# Patient Record
Sex: FEMALE | Race: WHITE | NOT HISPANIC OR LATINO | Employment: OTHER | URBAN - METROPOLITAN AREA
[De-identification: names, ages, dates, MRNs, and addresses within clinical notes are randomized per-mention and may not be internally consistent; named-entity substitution may affect disease eponyms.]

---

## 2024-06-16 ENCOUNTER — HOSPITAL ENCOUNTER (EMERGENCY)
Facility: HOSPITAL | Age: 84
Discharge: HOME | End: 2024-06-16
Attending: EMERGENCY MEDICINE | Admitting: EMERGENCY MEDICINE
Payer: MEDICARE

## 2024-06-16 ENCOUNTER — APPOINTMENT (OUTPATIENT)
Facility: HOSPITAL | Age: 84
End: 2024-06-16
Payer: MEDICARE

## 2024-06-16 VITALS
RESPIRATION RATE: 22 BRPM | OXYGEN SATURATION: 100 % | HEIGHT: 63 IN | DIASTOLIC BLOOD PRESSURE: 88 MMHG | HEART RATE: 73 BPM | WEIGHT: 170 LBS | SYSTOLIC BLOOD PRESSURE: 186 MMHG | BODY MASS INDEX: 30.12 KG/M2 | TEMPERATURE: 98.2 F

## 2024-06-16 DIAGNOSIS — R51.9 ACUTE NONINTRACTABLE HEADACHE, UNSPECIFIED HEADACHE TYPE: Primary | ICD-10-CM

## 2024-06-16 PROCEDURE — 99284 EMERGENCY DEPT VISIT MOD MDM: CPT

## 2024-06-16 PROCEDURE — 70450 CT HEAD/BRAIN W/O DYE: CPT

## 2024-06-16 PROCEDURE — 96374 THER/PROPH/DIAG INJ IV PUSH: CPT

## 2024-06-16 PROCEDURE — 25010000002 KETOROLAC TROMETHAMINE PER 15 MG: Performed by: EMERGENCY MEDICINE

## 2024-06-16 RX ORDER — KETOROLAC TROMETHAMINE 15 MG/ML
10 INJECTION, SOLUTION INTRAMUSCULAR; INTRAVENOUS ONCE
Status: COMPLETED | OUTPATIENT
Start: 2024-06-16 | End: 2024-06-16

## 2024-06-16 RX ORDER — SODIUM CHLORIDE 0.9 % (FLUSH) 0.9 %
10 SYRINGE (ML) INJECTION AS NEEDED
Status: DISCONTINUED | OUTPATIENT
Start: 2024-06-16 | End: 2024-06-16 | Stop reason: HOSPADM

## 2024-06-16 RX ADMIN — KETOROLAC TROMETHAMINE 10 MG: 15 INJECTION, SOLUTION INTRAMUSCULAR; INTRAVENOUS at 10:08

## 2024-06-16 NOTE — FSED PROVIDER NOTE
"Subjective  History of Present Illness:    The patient presents the emergency department with headache.  The patient reports a right frontal and occipital headache.  Patient presents to the emergency department with her sister and niece.  The patient's sister advises that the patient has a history of bipolar disorder and that the patient has been manic.  The patient has not been sleeping.  The patient's sister also advises that the patient has been arguing with her eldest daughter.  She believes that this stressor is contributing to the patient's headache.  Patient denies aggravating factors but reports that Tylenol creates partial alleviation.      Nurses Notes reviewed and agree, including vitals, allergies, social history and prior medical history.     REVIEW OF SYSTEMS: All systems reviewed and not pertinent unless noted.  Review of Systems   Neurological:  Positive for headaches.       Past Medical History:   Diagnosis Date    Fibrocystic breast     Menopausal symptoms        Allergies:    Erythromycin      History reviewed. No pertinent surgical history.      Social History     Socioeconomic History    Marital status:    Tobacco Use    Smoking status: Never   Substance and Sexual Activity    Alcohol use: No    Sexual activity: Yes     Partners: Male         Family History   Problem Relation Age of Onset    Breast cancer Mother     Colon cancer Mother     Colon cancer Sister        Objective  Physical Exam:  BP (!) 186/88   Pulse 73   Temp 98.2 °F (36.8 °C) (Oral)   Resp 22   Ht 160 cm (63\")   Wt 77.1 kg (170 lb)   SpO2 100%   BMI 30.11 kg/m²      Physical Exam  Vitals and nursing note reviewed.   Constitutional:       Appearance: Normal appearance.   HENT:      Right Ear: External ear normal.      Left Ear: External ear normal.      Nose: Nose normal.      Mouth/Throat:      Mouth: Mucous membranes are moist.   Eyes:      Extraocular Movements: Extraocular movements intact.   Cardiovascular:      " Rate and Rhythm: Normal rate and regular rhythm.   Pulmonary:      Effort: Pulmonary effort is normal.      Breath sounds: Normal breath sounds.   Musculoskeletal:         General: Normal range of motion.   Skin:     General: Skin is warm and dry.   Neurological:      General: No focal deficit present.      Mental Status: She is alert.      Sensory: No sensory deficit.      Motor: No weakness.   Psychiatric:         Mood and Affect: Mood normal.         Behavior: Behavior normal.         Thought Content: Thought content normal.         Procedures    ED Course:         Lab Results (last 24 hours)       ** No results found for the last 24 hours. **             CT Head Without Contrast    Result Date: 6/16/2024  CT HEAD WO CONTRAST Date of Exam: 6/16/2024 9:50 AM EDT Indication: headache. Comparison: None available. Technique: Axial CT images were obtained of the head without contrast administration.  Automated exposure control and iterative construction methods were used. FINDINGS: Gray-white differentiation is maintained and there is no evidence of intracranial hemorrhage, mass or mass effect. Age-related changes of the brain are present including volume loss and typical periventricular sequela of chronic small vessel ischemia. Subarachnoid spaces overlying both frontal and parietal lobes are prominent, either chronic hygroma or ex vacuo prominence due to adjacent volume loss. There is otherwise no evidence of intracranial hemorrhage, mass or mass effect. The ventricles are normal in size and configuration accounting for surrounding volume loss. The orbits are normal and the paranasal sinuses are grossly clear.     Impression: Age-related changes of the brain as above, otherwise without evidence of acute intracranial abnormality. Electronically Signed: Eric Claire MD  6/16/2024 10:18 AM EDT  Workstation ID: [id number]        MDM     Amount and/or Complexity of Data Reviewed  Tests in the radiology section of CPT®:  reviewed  Tests in the medicine section of CPT®: reviewed        Initial impression of presenting illness: Tension headache    DDX: includes but is not limited to: Intracranial hemorrhage, subdural hematoma, hypertensive headache, migraine    Patient arrives ambulatory with vitals interpreted by myself.     Pertinent features from physical exam: Normal neurologic exam.    Initial diagnostic plan: CT and symptomatic treatment    Results from initial plan were reviewed and interpreted by me revealing reviewed the patient's CT of the head which demonstrates no acute disease    Diagnostic information from other sources: I reviewed outside records including the discharge summary from the patient's recent admission 2 months ago to [facility] for hyponatremia.    Interventions / Re-evaluation: Patient felt some relief of her headache    Medications   sodium chloride 0.9 % flush 10 mL (has no administration in time range)   ketorolac (TORADOL) injection 10 mg (10 mg Intravenous Given 6/16/24 1008)       Results/clinical rationale were discussed with patient, patient's sister, and patient's son    Consultations/Discussion of results with other physicians:     Data interpreted: Nursing notes reviewed, vital signs reviewed.    CT of the head reviewed independently interpreted by me.  EKG independently interpreted by me.  O2 saturation:    Counseling: Discussed the results above with the patient regarding need for discharged home. Return precautions were given to patient, patient's sister, and patient's son.  Patient understands and agrees plan of care.      -----  ED Disposition       ED Disposition   Discharge    Condition   Stable    Comment   --             Final diagnoses:   Acute nonintractable headache, unspecified headache type      Your Follow-Up Providers       your psychiatrist in 3-4 days.                       Contact information for after-discharge care    Follow-up information has not been  specified.                    Your medication list        CONTINUE taking these medications        Instructions Last Dose Given Next Dose Due   ibandronate 150 MG tablet  Commonly known as: BONIVA           levothyroxine 137 MCG tablet  Commonly known as: SYNTHROID, LEVOTHROID           losartan 50 MG tablet  Commonly known as: COZAAR           omeprazole 40 MG capsule  Commonly known as: priLOSEC           traZODone 50 MG tablet  Commonly known as: DESYREL           verapamil  MG 24 hr capsule  Commonly known as: VERELAN

## 2024-06-22 ENCOUNTER — HOSPITAL ENCOUNTER (EMERGENCY)
Facility: HOSPITAL | Age: 84
Discharge: HOME | End: 2024-06-23
Attending: STUDENT IN AN ORGANIZED HEALTH CARE EDUCATION/TRAINING PROGRAM | Admitting: STUDENT IN AN ORGANIZED HEALTH CARE EDUCATION/TRAINING PROGRAM
Payer: MEDICARE

## 2024-06-22 ENCOUNTER — APPOINTMENT (OUTPATIENT)
Dept: CT IMAGING | Facility: HOSPITAL | Age: 84
End: 2024-06-22
Payer: MEDICARE

## 2024-06-22 DIAGNOSIS — E03.9 HYPOTHYROIDISM, UNSPECIFIED TYPE: ICD-10-CM

## 2024-06-22 DIAGNOSIS — L03.116 CELLULITIS OF LEFT LEG: ICD-10-CM

## 2024-06-22 DIAGNOSIS — G47.00 INSOMNIA, UNSPECIFIED TYPE: ICD-10-CM

## 2024-06-22 DIAGNOSIS — Z79.899 MEDICATION COURSE CHANGED: ICD-10-CM

## 2024-06-22 DIAGNOSIS — F30.9 MANIA: Primary | ICD-10-CM

## 2024-06-22 LAB
ALBUMIN SERPL-MCNC: 4.3 G/DL (ref 3.5–5.2)
ALBUMIN/GLOB SERPL: 1.3 G/DL
ALP SERPL-CCNC: 106 U/L (ref 39–117)
ALT SERPL W P-5'-P-CCNC: 19 U/L (ref 1–33)
AMPHET+METHAMPHET UR QL: NEGATIVE
AMPHETAMINES UR QL: NEGATIVE
ANION GAP SERPL CALCULATED.3IONS-SCNC: 9 MMOL/L (ref 5–15)
APAP SERPL-MCNC: <5 MCG/ML (ref 0–30)
AST SERPL-CCNC: 31 U/L (ref 1–32)
BARBITURATES UR QL SCN: NEGATIVE
BASOPHILS # BLD AUTO: 0.07 10*3/MM3 (ref 0–0.2)
BASOPHILS NFR BLD AUTO: 1.1 % (ref 0–1.5)
BENZODIAZ UR QL SCN: NEGATIVE
BILIRUB SERPL-MCNC: 0.3 MG/DL (ref 0–1.2)
BILIRUB UR QL STRIP: NEGATIVE
BUN SERPL-MCNC: 11 MG/DL (ref 8–23)
BUN/CREAT SERPL: 13.6 (ref 7–25)
BUPRENORPHINE SERPL-MCNC: NEGATIVE NG/ML
CA-I SERPL ISE-MCNC: 1.41 MMOL/L (ref 1.12–1.32)
CALCIUM SPEC-SCNC: 10.8 MG/DL (ref 8.6–10.5)
CANNABINOIDS SERPL QL: NEGATIVE
CHLORIDE SERPL-SCNC: 96 MMOL/L (ref 98–107)
CLARITY UR: CLEAR
CO2 SERPL-SCNC: 30 MMOL/L (ref 22–29)
COCAINE UR QL: NEGATIVE
COLOR UR: YELLOW
CREAT SERPL-MCNC: 0.81 MG/DL (ref 0.57–1)
CRP SERPL-MCNC: 0.45 MG/DL (ref 0–0.5)
D-LACTATE SERPL-SCNC: 1.2 MMOL/L (ref 0.5–2)
DEPRECATED RDW RBC AUTO: 47.7 FL (ref 37–54)
EGFRCR SERPLBLD CKD-EPI 2021: 70.8 ML/MIN/1.73
EOSINOPHIL # BLD AUTO: 0.27 10*3/MM3 (ref 0–0.4)
EOSINOPHIL NFR BLD AUTO: 4.1 % (ref 0.3–6.2)
ERYTHROCYTE [DISTWIDTH] IN BLOOD BY AUTOMATED COUNT: 13.5 % (ref 12.3–15.4)
ETHANOL BLD-MCNC: <10 MG/DL (ref 0–10)
FENTANYL UR-MCNC: NEGATIVE NG/ML
FLUAV RNA RESP QL NAA+PROBE: NOT DETECTED
FLUBV RNA RESP QL NAA+PROBE: NOT DETECTED
GLOBULIN UR ELPH-MCNC: 3.2 GM/DL
GLUCOSE SERPL-MCNC: 131 MG/DL (ref 65–99)
GLUCOSE UR STRIP-MCNC: NEGATIVE MG/DL
HCT VFR BLD AUTO: 46.2 % (ref 34–46.6)
HGB BLD-MCNC: 15 G/DL (ref 12–15.9)
HGB UR QL STRIP.AUTO: NEGATIVE
IMM GRANULOCYTES # BLD AUTO: 0.05 10*3/MM3 (ref 0–0.05)
IMM GRANULOCYTES NFR BLD AUTO: 0.8 % (ref 0–0.5)
KETONES UR QL STRIP: NEGATIVE
LEUKOCYTE ESTERASE UR QL STRIP.AUTO: NEGATIVE
LYMPHOCYTES # BLD AUTO: 1.03 10*3/MM3 (ref 0.7–3.1)
LYMPHOCYTES NFR BLD AUTO: 15.6 % (ref 19.6–45.3)
MAGNESIUM SERPL-MCNC: 1.9 MG/DL (ref 1.6–2.4)
MCH RBC QN AUTO: 30.9 PG (ref 26.6–33)
MCHC RBC AUTO-ENTMCNC: 32.5 G/DL (ref 31.5–35.7)
MCV RBC AUTO: 95.3 FL (ref 79–97)
METHADONE UR QL SCN: NEGATIVE
MONOCYTES # BLD AUTO: 0.66 10*3/MM3 (ref 0.1–0.9)
MONOCYTES NFR BLD AUTO: 10 % (ref 5–12)
NEUTROPHILS NFR BLD AUTO: 4.52 10*3/MM3 (ref 1.7–7)
NEUTROPHILS NFR BLD AUTO: 68.4 % (ref 42.7–76)
NITRITE UR QL STRIP: NEGATIVE
NRBC BLD AUTO-RTO: 0 /100 WBC (ref 0–0.2)
OPIATES UR QL: NEGATIVE
OXYCODONE UR QL SCN: NEGATIVE
PCP UR QL SCN: NEGATIVE
PH UR STRIP.AUTO: 6.5 [PH] (ref 5–8)
PLATELET # BLD AUTO: 224 10*3/MM3 (ref 140–450)
PMV BLD AUTO: 9.3 FL (ref 6–12)
POTASSIUM SERPL-SCNC: 4.1 MMOL/L (ref 3.5–5.2)
PROCALCITONIN SERPL-MCNC: 0.07 NG/ML (ref 0–0.25)
PROT SERPL-MCNC: 7.5 G/DL (ref 6–8.5)
PROT UR QL STRIP: NEGATIVE
RBC # BLD AUTO: 4.85 10*6/MM3 (ref 3.77–5.28)
RSV RNA RESP QL NAA+PROBE: NOT DETECTED
SALICYLATES SERPL-MCNC: <0.3 MG/DL
SARS-COV-2 RNA RESP QL NAA+PROBE: NOT DETECTED
SODIUM SERPL-SCNC: 135 MMOL/L (ref 136–145)
SP GR UR STRIP: 1.01 (ref 1–1.03)
T4 FREE SERPL-MCNC: 1.4 NG/DL (ref 0.92–1.68)
TRICYCLICS UR QL SCN: NEGATIVE
TROPONIN T SERPL HS-MCNC: 17 NG/L
TROPONIN T SERPL HS-MCNC: 17 NG/L
TSH SERPL DL<=0.05 MIU/L-ACNC: 8.41 UIU/ML (ref 0.27–4.2)
UROBILINOGEN UR QL STRIP: NORMAL
WBC NRBC COR # BLD AUTO: 6.6 10*3/MM3 (ref 3.4–10.8)

## 2024-06-22 PROCEDURE — 93005 ELECTROCARDIOGRAM TRACING: CPT | Performed by: STUDENT IN AN ORGANIZED HEALTH CARE EDUCATION/TRAINING PROGRAM

## 2024-06-22 PROCEDURE — 80143 DRUG ASSAY ACETAMINOPHEN: CPT | Performed by: STUDENT IN AN ORGANIZED HEALTH CARE EDUCATION/TRAINING PROGRAM

## 2024-06-22 PROCEDURE — 80179 DRUG ASSAY SALICYLATE: CPT | Performed by: STUDENT IN AN ORGANIZED HEALTH CARE EDUCATION/TRAINING PROGRAM

## 2024-06-22 PROCEDURE — 84439 ASSAY OF FREE THYROXINE: CPT | Performed by: STUDENT IN AN ORGANIZED HEALTH CARE EDUCATION/TRAINING PROGRAM

## 2024-06-22 PROCEDURE — 36415 COLL VENOUS BLD VENIPUNCTURE: CPT

## 2024-06-22 PROCEDURE — 99283 EMERGENCY DEPT VISIT LOW MDM: CPT

## 2024-06-22 PROCEDURE — 84145 PROCALCITONIN (PCT): CPT | Performed by: STUDENT IN AN ORGANIZED HEALTH CARE EDUCATION/TRAINING PROGRAM

## 2024-06-22 PROCEDURE — 85025 COMPLETE CBC W/AUTO DIFF WBC: CPT | Performed by: STUDENT IN AN ORGANIZED HEALTH CARE EDUCATION/TRAINING PROGRAM

## 2024-06-22 PROCEDURE — 81003 URINALYSIS AUTO W/O SCOPE: CPT | Performed by: STUDENT IN AN ORGANIZED HEALTH CARE EDUCATION/TRAINING PROGRAM

## 2024-06-22 PROCEDURE — 80307 DRUG TEST PRSMV CHEM ANLYZR: CPT | Performed by: STUDENT IN AN ORGANIZED HEALTH CARE EDUCATION/TRAINING PROGRAM

## 2024-06-22 PROCEDURE — 87637 SARSCOV2&INF A&B&RSV AMP PRB: CPT | Performed by: STUDENT IN AN ORGANIZED HEALTH CARE EDUCATION/TRAINING PROGRAM

## 2024-06-22 PROCEDURE — 84443 ASSAY THYROID STIM HORMONE: CPT | Performed by: STUDENT IN AN ORGANIZED HEALTH CARE EDUCATION/TRAINING PROGRAM

## 2024-06-22 PROCEDURE — 82330 ASSAY OF CALCIUM: CPT | Performed by: STUDENT IN AN ORGANIZED HEALTH CARE EDUCATION/TRAINING PROGRAM

## 2024-06-22 PROCEDURE — 82077 ASSAY SPEC XCP UR&BREATH IA: CPT | Performed by: STUDENT IN AN ORGANIZED HEALTH CARE EDUCATION/TRAINING PROGRAM

## 2024-06-22 PROCEDURE — 84484 ASSAY OF TROPONIN QUANT: CPT | Performed by: STUDENT IN AN ORGANIZED HEALTH CARE EDUCATION/TRAINING PROGRAM

## 2024-06-22 PROCEDURE — 83735 ASSAY OF MAGNESIUM: CPT | Performed by: STUDENT IN AN ORGANIZED HEALTH CARE EDUCATION/TRAINING PROGRAM

## 2024-06-22 PROCEDURE — 83605 ASSAY OF LACTIC ACID: CPT | Performed by: STUDENT IN AN ORGANIZED HEALTH CARE EDUCATION/TRAINING PROGRAM

## 2024-06-22 PROCEDURE — 86140 C-REACTIVE PROTEIN: CPT | Performed by: STUDENT IN AN ORGANIZED HEALTH CARE EDUCATION/TRAINING PROGRAM

## 2024-06-22 PROCEDURE — 80053 COMPREHEN METABOLIC PANEL: CPT | Performed by: STUDENT IN AN ORGANIZED HEALTH CARE EDUCATION/TRAINING PROGRAM

## 2024-06-22 RX ORDER — MULTIVIT WITH MINERALS/LUTEIN
500 TABLET ORAL DAILY
COMMUNITY

## 2024-06-22 RX ORDER — HYDROCHLOROTHIAZIDE 12.5 MG/1
12.5 TABLET ORAL DAILY
COMMUNITY

## 2024-06-22 RX ORDER — CEPHALEXIN 500 MG/1
500 CAPSULE ORAL 3 TIMES DAILY
Qty: 21 CAPSULE | Refills: 0 | Status: SHIPPED | OUTPATIENT
Start: 2024-06-22 | End: 2024-06-29

## 2024-06-22 RX ORDER — VALSARTAN 160 MG/1
320 TABLET ORAL DAILY
COMMUNITY

## 2024-06-22 RX ORDER — DOCUSATE SODIUM 100 MG/1
100 CAPSULE, LIQUID FILLED ORAL 2 TIMES DAILY
COMMUNITY

## 2024-06-22 RX ORDER — FERROUS SULFATE 325(65) MG
325 TABLET ORAL
COMMUNITY

## 2024-06-22 RX ORDER — CEPHALEXIN 250 MG/1
500 CAPSULE ORAL ONCE
Status: COMPLETED | OUTPATIENT
Start: 2024-06-22 | End: 2024-06-22

## 2024-06-22 RX ORDER — MIRTAZAPINE 15 MG/1
15 TABLET, FILM COATED ORAL NIGHTLY
COMMUNITY

## 2024-06-22 RX ORDER — DULOXETIN HYDROCHLORIDE 20 MG/1
40 CAPSULE, DELAYED RELEASE ORAL DAILY
COMMUNITY

## 2024-06-22 RX ORDER — ASPIRIN 81 MG/1
81 TABLET, CHEWABLE ORAL DAILY
COMMUNITY

## 2024-06-22 RX ADMIN — CEPHALEXIN 500 MG: 250 CAPSULE ORAL at 22:19

## 2024-06-22 NOTE — CONSULTS
Carlie Vicente  1940    “This provider is located at [facility] located at [address], [address] using a secure Zoom Video Visit. Patient is being seen remotely via telehealth at [address], [address], and stated they are in a secure environment for this session. The patient's condition being diagnosed/treated is appropriate for telemedicine. The provider identified themselves as well as their credentials.   The patient, and/or patients guardian, consent to be seen remotely, and when consent is given they understand that the consent allows for patient identifiable information to be sent to a third party as needed.   They may refuse to be seen remotely at any time. The electronic data is encrypted and password protected, and the patient and/or guardian has been advised of the potential risks to privacy not withstanding such measures.”    Race/Ethnicity: White or   Martial Status:   Guardian Name/Contact/etc: Self; patient's son Alf Vicente has POA.  Pt Lives With:  Patient lives at [facility]  Occupation: retired  Appearance: clean and casually dressed, appropriate     Time Called for Assessment: Notified at 1800 by YESSY De La O of needed assessment.   Assessment Start and End: [id number] (assessment with patient)      [id number] (collateral information)      DATA:   Clinician received a call from [facility] staff for a behavioral health consult.  The patient is agreeable to speak with the behavioral health team.  Met with patient at bedside. Patient is not under 1:1 security monitoring.  The attending treatment team is ANGIE Zavala and Dr. Menezes.  Patient presents today with chief compliant of manic behaviors. Family informed triage staff that patient has a history of Bipolar.  Clinician completed assessment with patient and observations are documented as follows.    ASSESSMENT:    Clinician consulted with patient for  "mental status exam and assessment.  Clinical descriptors are documented as follows.  Clinician completed CSSRS with patient for suicide risk assessment.  The results of patient’s CSSRS documented as follows.    Presenting Problems: Patient states that she was brought to the emergency department by her children because they \"think I'm acting funny\". Patient elaborated by saying that her children believe she hasn't been making rational decision. Patient tells me that one of those \"irrational decisions\" was accusing someone of stealing her purse. Patient states that she forgot she actually hid the purse. Patient reports that she was making \"irrational decisions\" up until 3 nights ago. Patient states that she no longer feels like she is making \"irrational decisions\". Patient is alert and oriented during assessment. Patient was calm and pleasant. Patient denies active SI. Patient states that she sometimes doesn't care if she lives or dies. Patient denies a current death wish. Patient also denies HI.     [id number]: Clinician spoke with patient's daughter, Renetta and patient's son, Alf via telephone for collateral information. Patient's son, Alf is POA however, they deny having legal guardianship of patient. Patient's family states that patient has been displaying manic behaviors for about 3 weeks. Patient's daughter states that patient is typically more reserved but has recently been talkative and \"chatty\". They state that patient was knocking on a friend's door at the assisted living facility at 0430 this morning. They state that patient was then trying to get a ride to her sister's house at 0500. They tell me that patient hasn't been sleeping and has been telling them things that don't make sense. They say patient has been wanting to \"buy things\" such as jewelry and furniture. They are are concerned that patient is minimizing her symptoms because she likes how she is currently feeling and is on a \"high\" from " "being manic. They state that patient can no longer ride the assisted facility's van to doctor's appointments due to her current state. They are fearful that patient will be kicked out of the assisted living facility if she doesn't receive help. They are requesting inpatient psychiatric treatment.     Current Stressors: mental health condition     Established Therapy, Medication Management or Other Mental Health Services: Patient is not currently engaged in therapy. Patient sees a psychiatrist at [facility] with her most recent appointment being about a week ago.     Current Psychiatric Medications: Patient's family reports concerns regarding patient's medications. Patient's daughter states that patient recently went 3 weeks without Cymbalta  as it was reportedly discontinued. Patient has now been taking it for about 1 month. Patient's family states that patient was prescribed Abilify on Wednesday, received one dose of it, and now it's not on her medication list. They also believe that patient is not receiving all of the correct doses of her medications. They report that there has been a lot of miscommunication about her medications. They know for sure that patient is prescribed Cymbalta. Below is a list of home medications from patient's chart.     Mental Status Exam:  Behavior: Appropriate  Psychomotor Movement: Appropriate  Attention and Cooperation: Normal and Cooperative  Mood: neutral and Affect: Appropriate  Orientation: alert and oriented to person, place, and time   Thought Process: linear, logical, and goal directed  Thought Content: normal  Delusions: None  Hallucinations: None and Not demonstrated today   Concentration: Normal  Suicidal Ideation: Absent  Homicidal Ideation: Absent  Hopelessness: Patient denies feeling hopeful. Patient states that she doesn't feel like she will live much longer and reports \"I'm not afraid of it\". Patient states that she's not going to end her life, she just doesn't think " she will live much longer.  Speech: Normal  Eye Contact: Good  Insight: Fair  Judgement: Fair    Depression: Patient denies feelings of depression.   Anxiety: 10  Sleep: Patient states that she has been sleeping very poorly. Patient reports getting about 2 hrs of sleep daily for the past 4 days. Per chart review, she told the ED provider that she hasn't slept at all in 6 days.   Appetite: Poor       Hx of Psychiatric or Detox Hospitalizations: Per patient's family, patient has been admitted at [facility] in the past.   Most recent inpatient admission: Possibly around 2021    Suicidal Ideation Assessment:    COLUMBIA-SUICIDE SEVERITY RATING SCALE  Psychiatric Inpatient Setting - Discharge Screener    Ask questions that are bold and underlined Discharge   Ask Questions 1 and 2 YES NO   Wish to be Dead:   Person endorses thoughts about a wish to be dead or not alive anymore, or wish to fall asleep and not wake up.  While you were here in the hospital, have you wished you were dead or wished you could go to sleep and not wake up?  X   Suicidal Thoughts:   General non-specific thoughts of wanting to end one's life/die by suicide, “I've thought about killing myself” without general thoughts of ways to kill oneself/associated methods, intent, or plan.   While you were here in the hospital, have you actually had thoughts about killing yourself?   X   If YES to 2, ask questions 3, 4, 5, and 6.  If NO to 2, go directly to question 6   3) Suicidal Thoughts with Method (without Specific Plan or Intent to Act):   Person endorses thoughts of suicide and has thought of a least one method during the assessment period. This is different than a specific plan with time, place or method details worked out. “I thought about taking an overdose but I never made a specific plan as to when where or how I would actually do it….and I would never go through with it.”   Have you been thinking about how you might kill yourself?     "  4) Suicidal Intent (without Specific Plan):   Active suicidal thoughts of killing oneself and patient reports having some intent to act on such thoughts, as opposed to “I have the thoughts but I definitely will not do anything about them.”   Have you had these thoughts and had some intention of acting on them or do you have some intention of acting on them after you leave the hospital?      5) Suicide Intent with Specific Plan:   Thoughts of killing oneself with details of plan fully or partially worked out and person has some intent to carry it out.   Have you started to work out or worked out the details of how to kill yourself either for while you were here in the hospital or for after you leave the hospital? Do you intend to carry out this plan?        6) Suicide Behavior    While you were here in the hospital, have you done anything, started to do anything, or prepared to do anything to end your life?    Examples: Took pills, cut yourself, tried to hang yourself, took out pills but didn't swallow any because you changed your mind or someone took them from you, collected pills, secured a means of obtaining a gun, gave away valuables, wrote a will or suicide note, etc.  X     Suicidal: Patient admits to \"sometimes\" not caring if she lives or dies. Patient states that she never thinks of ways to kill herself or has intent, she just doesn't care.  Patient adamantly denies active death wish, plan, or intent to kill herself.   Previous Attempts: One prior suicide attempt  Most Recent Attempt: \"years ago\"    Psychosocial History  Highest Level of Education: Unknown to clinician  Family Hx of Mental Health/Substance Abuse: Patient did not disclose  Patient Trauma/Abuse History: Patient did not disclose.,   Does this require reporting: No  Patient Identified Support System (List family members, loved ones, guardians, friends, etc): Patient identified her children as supports.     Legal History / History of Violence: " Denies significant history of legal issues.  Denies any history of significant violence.   History of Inappropriate Sexual Behavior: No  Current Medical Conditions or Biomedical Complications: Yes; patient is wheelchair bound. Patient states that she has a difficult time dressing and bathing herself, but has no issues going to the restroom on her own. 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 Patient's family report a history of Bipolar Disorder. Per chart review, patient has a history of hypothyroidism, hypercalcemia, depression, and chronic kidney disease stage 2.     Social Determinants of Health  Housing Instability and/or Utility Needs: No  Food Insecurity: No  Transportation Needs: No    Substance Use History  Active Use: No; UDS and ETOH normal on arrival.     PLAN:  At this time, clinician recommends inpatient treatment based upon the above assessment. Clinician collaborated with the treatment team who agree to adopt the recommendations.  Clinician discussed recommendations with patient and/or patient support systems, and patient is agreeable to the plan.  Patient is agreeable for referrals to be sent to facilities and agencies for treatment.    Have the levels of care been discussed with the patient? Yes  Level of care recommendation: Inpatient for recent manic behaviors and medication evaluation.   Is patient agreeable to treatment? Yes    Care Coordination Timeline:  2000-2047: Required documentation completed. Patient informed ANGIE Zavala that she prefers placement at [facility]. Clinician to send referral.     2119: Referral successfully faxed to [facility]. Clinician to follow up.    2130: Clinician contacted [facility] and spoke to Margaret in admissions who reports no bed availability.     2131: Clinician contacted [facility] and spoke to Annie in admissions who reports no bed availability.     2132:  Presented referral to cristina Maguire RN at [facility]. Clinician to follow up.     2137: Clinician contacted [facility] and spoke to josselin Villanueva RN. She requested that referral be sent to their facility. COVID test required. Per chart Kay, they are short staffed so it might be awhile before they can review.     2141: Clinician updated patient's son, Alf.    2157: Clinician received a call from cristina Maguire RN at [facility] who states that patient was denied. Clinician updated Dr. Menezes and ANGIE Zavala.     2245: Waiting for COVID to result. Referral sending to [facility].     2321: COVID test resulted, faxed to [facility].     2352: Per Kay at [facility], referral and COVID results received. Kay states that they have to complete an intake at their facility and will review as soon as possible.     2355: Received a call from ANGIE Zavala requesting an update on the plan if [facility] denies.    0115: Updated Dr. Wright and ANGIE Umanzor that we are still waiting to hear back from [facility].     0240: Received a call from Huber at [facility] requesting updated vitals and POA paperwork.     0243: Clinician contacted patient's son and inquired about POA paperwork. He confirmed that he has them and will give to the nurse to fax to clinician.     0330: Received POA  paperwork via fax.    0350: Requested information faxing to [facility].     Clinician contacted [facility] and confirmed that requested information was received. Per Kay, they will review documentation and call back.     0705: Followed up with referral at [facility]. Clinician spoke with Sarita who states that they still have referral, they just haven't reviewed it yet. Per Sarita, they will review as soon as possible. Clinician updated ANGIE Monsivais and Dr. Wright.     Clinician to update YESSY De La O who will follow up with final discharge disposition.     SIGNATURE  Dilia Clarke,  CSW

## 2024-06-23 VITALS
DIASTOLIC BLOOD PRESSURE: 97 MMHG | TEMPERATURE: 98.3 F | RESPIRATION RATE: 20 BRPM | WEIGHT: 172 LBS | BODY MASS INDEX: 31.65 KG/M2 | SYSTOLIC BLOOD PRESSURE: 160 MMHG | HEART RATE: 86 BPM | HEIGHT: 62 IN | OXYGEN SATURATION: 97 %

## 2024-06-23 LAB
D-LACTATE SERPL-SCNC: 1 MMOL/L (ref 0.5–2)
QT INTERVAL: 362 MS
QTC INTERVAL: 401 MS

## 2024-06-23 PROCEDURE — 87070 CULTURE OTHR SPECIMN AEROBIC: CPT | Performed by: NURSE PRACTITIONER

## 2024-06-23 PROCEDURE — 87040 BLOOD CULTURE FOR BACTERIA: CPT | Performed by: NURSE PRACTITIONER

## 2024-06-23 PROCEDURE — 96365 THER/PROPH/DIAG IV INF INIT: CPT

## 2024-06-23 PROCEDURE — 25010000002 CEFTRIAXONE PER 250 MG: Performed by: NURSE PRACTITIONER

## 2024-06-23 PROCEDURE — 83605 ASSAY OF LACTIC ACID: CPT | Performed by: NURSE PRACTITIONER

## 2024-06-23 PROCEDURE — 87205 SMEAR GRAM STAIN: CPT | Performed by: NURSE PRACTITIONER

## 2024-06-23 RX ORDER — HYDROCHLOROTHIAZIDE 25 MG/1
12.5 TABLET ORAL DAILY
Status: DISCONTINUED | OUTPATIENT
Start: 2024-06-23 | End: 2024-06-23 | Stop reason: HOSPADM

## 2024-06-23 RX ORDER — HYDROCHLOROTHIAZIDE 12.5 MG/1
12.5 TABLET ORAL DAILY
Status: DISCONTINUED | OUTPATIENT
Start: 2024-06-23 | End: 2024-06-23

## 2024-06-23 RX ORDER — DULOXETIN HYDROCHLORIDE 20 MG/1
20 CAPSULE, DELAYED RELEASE ORAL DAILY
Status: DISCONTINUED | OUTPATIENT
Start: 2024-06-23 | End: 2024-06-23

## 2024-06-23 RX ORDER — LEVOTHYROXINE SODIUM 137 UG/1
137 TABLET ORAL
Status: DISCONTINUED | OUTPATIENT
Start: 2024-06-23 | End: 2024-06-23 | Stop reason: HOSPADM

## 2024-06-23 RX ORDER — CEPHALEXIN 250 MG/1
500 CAPSULE ORAL EVERY 8 HOURS SCHEDULED
Status: DISCONTINUED | OUTPATIENT
Start: 2024-06-23 | End: 2024-06-23 | Stop reason: ALTCHOICE

## 2024-06-23 RX ORDER — VALSARTAN 80 MG/1
80 TABLET ORAL
Status: DISCONTINUED | OUTPATIENT
Start: 2024-06-23 | End: 2024-06-23 | Stop reason: HOSPADM

## 2024-06-23 RX ADMIN — LEVOTHYROXINE SODIUM 137 MCG: 137 TABLET ORAL at 09:25

## 2024-06-23 RX ADMIN — SODIUM CHLORIDE 2000 MG: 900 INJECTION INTRAVENOUS at 09:50

## 2024-06-23 RX ADMIN — VALSARTAN 80 MG: 80 TABLET, FILM COATED ORAL at 09:59

## 2024-06-23 RX ADMIN — HYDROCHLOROTHIAZIDE 12.5 MG: 25 TABLET ORAL at 09:56

## 2024-06-23 NOTE — CONSULTS
"Therapist received a call from Sarita with [facility] at 8:50 who reports their physician has reviewed the referral and has denied the patient due to \"she is frail and would not be a good fit for our unit right now.\" Therapist updated ANGIE Santizo, Charge RN, and Dr. Storey.     Per provider, patient will be discharged back to assisted living facility with outpatient management at this time.   "

## 2024-06-23 NOTE — CASE MANAGEMENT/SOCIAL WORK
Continued Stay Note  [facility]     Patient Name: Carlie Vicente  MRN: [id number]  Today's Date: 6/23/2024    Admit Date: 6/22/2024    Plan: Home   Discharge Plan       Row Name 06/23/24 1100       Plan    Plan Home    Plan Comments ED Charge came to MSW needing outpatient resources for pt. upon her d/c from ED. MSW met with pt. and pt.'s daughter at bedside. Pt.'s daughter reports that pt. is already established with behavioral health and declined additional outpatient resources. MSW updated ED Charge. Pt. to be discharged back to [facility]. No other needs or concerns.    Final Discharge Disposition Code 01 - home or self-care                   Discharge Codes    No documentation.                       AMARILYS Young

## 2024-06-23 NOTE — ED PROVIDER NOTES
EMERGENCY DEPARTMENT ENCOUNTER    Pt Name: Carlie Vicente  MRN: [id number]  Pt :   1940  Room Number:    Date of encounter:  2024  PCP: Man Carrion DO  ED Provider: King Menezes MD    Historian: Patient, family      HPI:  Chief Complaint: Ester        Context: Carlie Vicente is a 84-year-old woman with history of prior episodes of ester recent medication adjustments to her psychiatric medications brought in by family for manic type symptoms she says she has not slept in at least 6 days.  She called everyone in the family this morning at 5 AM they report she has been going on shopping sprees initially interested in [employer] and now a couch.  Of note her Cymbalta medication had been discontinued with her not knowing and then was restarted in May.  She has been having manic type symptoms for 2 weeks.  She was restarted on her Abilify 2 days ago but they feel like if anything the ester is worsening.  She complains that she feels like she is going to die and also was taken to the hospital the other day for concern for brain tumor because she had a headache.  Family would like her to be placed in inpatient psychiatric care.  No other complaints at this time.       PAST MEDICAL HISTORY  Past Medical History:   Diagnosis Date    Fibrocystic breast     Menopausal symptoms          PAST SURGICAL HISTORY  History reviewed. No pertinent surgical history.      FAMILY HISTORY  Family History   Problem Relation Age of Onset    Breast cancer Mother     Colon cancer Mother     Colon cancer Sister          SOCIAL HISTORY  Social History     Socioeconomic History    Marital status:    Tobacco Use    Smoking status: Never   Substance and Sexual Activity    Alcohol use: No    Sexual activity: Yes     Partners: Male         ALLERGIES  Erythromycin        REVIEW OF SYSTEMS  Review of Systems       All systems reviewed and negative except for those discussed in HPI.       PHYSICAL EXAM    I have  reviewed the triage vital signs and nursing notes.    ED Triage Vitals [06/22/24 1440]   Temp Heart Rate Resp BP SpO2   98.3 °F (36.8 °C) 79 20 (!) 181/106 97 %      Temp src Heart Rate Source Patient Position BP Location FiO2 (%)   Oral -- Sitting Right arm --       Physical Exam  GENERAL:   Appears fatigued, chronically ill  HENT: Nares patent.  Mildly dry mucous membranes, scabbing over the nose from recent dermatologic procedure  EYES: No scleral icterus.  Conjunctival injection  CV: Regular rhythm, regular rate.  RESPIRATORY: Normal effort.  No audible wheezes, rales or rhonchi.  ABDOMEN: Soft, nontender  MUSCULOSKELETAL: No deformities.   NEURO: Alert, moves all extremities, follows commands.  SKIN: Warm, dry, mild erythema and warmth of the left ankle to just distal to the left knee.      LAB RESULTS  Recent Results (from the past 24 hour(s))   ECG 12 Lead Electrolyte Imbalance    Collection Time: 06/22/24  3:35 PM   Result Value Ref Range    QT Interval 362 ms    QTC Interval 401 ms   Comprehensive Metabolic Panel    Collection Time: 06/22/24  3:59 PM    Specimen: Blood   Result Value Ref Range    Glucose 131 (H) 65 - 99 mg/dL    BUN 11 8 - 23 mg/dL    Creatinine 0.81 0.57 - 1.00 mg/dL    Sodium 135 (L) 136 - 145 mmol/L    Potassium 4.1 3.5 - 5.2 mmol/L    Chloride 96 (L) 98 - 107 mmol/L    CO2 30.0 (H) 22.0 - 29.0 mmol/L    Calcium 10.8 (H) 8.6 - 10.5 mg/dL    Total Protein 7.5 6.0 - 8.5 g/dL    Albumin 4.3 3.5 - 5.2 g/dL    ALT (SGPT) 19 1 - 33 U/L    AST (SGOT) 31 1 - 32 U/L    Alkaline Phosphatase 106 39 - 117 U/L    Total Bilirubin 0.3 0.0 - 1.2 mg/dL    Globulin 3.2 gm/dL    A/G Ratio 1.3 g/dL    BUN/Creatinine Ratio 13.6 7.0 - 25.0    Anion Gap 9.0 5.0 - 15.0 mmol/L    eGFR 70.8 >60.0 mL/min/1.73   Single High Sensitivity Troponin T    Collection Time: 06/22/24  3:59 PM    Specimen: Blood   Result Value Ref Range    HS Troponin T 17 (H) <14 ng/L   Lactic Acid, Plasma    Collection Time: 06/22/24  3:59  PM    Specimen: Blood   Result Value Ref Range    Lactate 1.2 0.5 - 2.0 mmol/L   C-reactive Protein    Collection Time: 06/22/24  3:59 PM    Specimen: Blood   Result Value Ref Range    C-Reactive Protein 0.45 0.00 - 0.50 mg/dL   Procalcitonin    Collection Time: 06/22/24  3:59 PM    Specimen: Blood   Result Value Ref Range    Procalcitonin 0.07 0.00 - 0.25 ng/mL   T4, Free    Collection Time: 06/22/24  3:59 PM    Specimen: Blood   Result Value Ref Range    Free T4 1.40 0.92 - 1.68 ng/dL   TSH    Collection Time: 06/22/24  3:59 PM    Specimen: Blood   Result Value Ref Range    TSH 8.410 (H) 0.270 - 4.200 uIU/mL   Magnesium    Collection Time: 06/22/24  3:59 PM    Specimen: Blood   Result Value Ref Range    Magnesium 1.9 1.6 - 2.4 mg/dL   Salicylate Level    Collection Time: 06/22/24  3:59 PM    Specimen: Blood   Result Value Ref Range    Salicylate <0.3 <=30.0 mg/dL   Ethanol    Collection Time: 06/22/24  3:59 PM    Specimen: Blood   Result Value Ref Range    Ethanol <10 0 - 10 mg/dL   Acetaminophen Level    Collection Time: 06/22/24  3:59 PM    Specimen: Blood   Result Value Ref Range    Acetaminophen <5.0 0.0 - 30.0 mcg/mL   CBC Auto Differential    Collection Time: 06/22/24  3:59 PM    Specimen: Blood   Result Value Ref Range    WBC 6.60 3.40 - 10.80 10*3/mm3    RBC 4.85 3.77 - 5.28 10*6/mm3    Hemoglobin 15.0 12.0 - 15.9 g/dL    Hematocrit 46.2 34.0 - 46.6 %    MCV 95.3 79.0 - 97.0 fL    MCH 30.9 26.6 - 33.0 pg    MCHC 32.5 31.5 - 35.7 g/dL    RDW 13.5 12.3 - 15.4 %    RDW-SD 47.7 37.0 - 54.0 fl    MPV 9.3 6.0 - 12.0 fL    Platelets 224 140 - 450 10*3/mm3    Neutrophil % 68.4 42.7 - 76.0 %    Lymphocyte % 15.6 (L) 19.6 - 45.3 %    Monocyte % 10.0 5.0 - 12.0 %    Eosinophil % 4.1 0.3 - 6.2 %    Basophil % 1.1 0.0 - 1.5 %    Immature Grans % 0.8 (H) 0.0 - 0.5 %    Neutrophils, Absolute 4.52 1.70 - 7.00 10*3/mm3    Lymphocytes, Absolute 1.03 0.70 - 3.10 10*3/mm3    Monocytes, Absolute 0.66 0.10 - 0.90 10*3/mm3     Eosinophils, Absolute 0.27 0.00 - 0.40 10*3/mm3    Basophils, Absolute 0.07 0.00 - 0.20 10*3/mm3    Immature Grans, Absolute 0.05 0.00 - 0.05 10*3/mm3    nRBC 0.0 0.0 - 0.2 /100 WBC   Calcium, Ionized    Collection Time: 06/22/24  3:59 PM    Specimen: Blood   Result Value Ref Range    Ionized Calcium 1.41 (H) 1.12 - 1.32 mmol/L   Urinalysis With Microscopic If Indicated (No Culture) - Urine, Clean Catch    Collection Time: 06/22/24  4:08 PM    Specimen: Urine, Clean Catch   Result Value Ref Range    Color, UA Yellow Yellow, Straw    Appearance, UA Clear Clear    pH, UA 6.5 5.0 - 8.0    Specific Gravity, UA 1.013 1.001 - 1.030    Glucose, UA Negative Negative    Ketones, UA Negative Negative    Bilirubin, UA Negative Negative    Blood, UA Negative Negative    Protein, UA Negative Negative    Leuk Esterase, UA Negative Negative    Nitrite, UA Negative Negative    Urobilinogen, UA 0.2 E.U./dL 0.2 - 1.0 E.U./dL   Urine Drug Screen - Urine, Clean Catch    Collection Time: 06/22/24  4:08 PM    Specimen: Urine, Clean Catch   Result Value Ref Range    THC, Screen, Urine Negative Negative    Phencyclidine (PCP), Urine Negative Negative    Cocaine Screen, Urine Negative Negative    Methamphetamine, Ur Negative Negative    Opiate Screen Negative Negative    Amphetamine Screen, Urine Negative Negative    Benzodiazepine Screen, Urine Negative Negative    Tricyclic Antidepressants Screen Negative Negative    Methadone Screen, Urine Negative Negative    Barbiturates Screen, Urine Negative Negative    Oxycodone Screen, Urine Negative Negative    Buprenorphine, Screen, Urine Negative Negative   Fentanyl, Urine - Urine, Clean Catch    Collection Time: 06/22/24  4:08 PM    Specimen: Urine, Clean Catch   Result Value Ref Range    Fentanyl, Urine Negative Negative   Single High Sensitivity Troponin T    Collection Time: 06/22/24  6:10 PM    Specimen: Arm, Left; Blood   Result Value Ref Range    HS Troponin T 17 (H) <14 ng/L   COVID-19,  FLU A/B, RSV PCR 1 HR TAT - Swab, Nasopharynx    Collection Time: 06/22/24 10:19 PM    Specimen: Nasopharynx; Swab   Result Value Ref Range    COVID19 Not Detected Not Detected - Ref. Range    Influenza A PCR Not Detected Not Detected    Influenza B PCR Not Detected Not Detected    RSV, PCR Not Detected Not Detected   Wound Culture - Wound, Leg, Left    Collection Time: 06/23/24  9:45 AM    Specimen: Leg, Left; Wound   Result Value Ref Range    Gram Stain No WBCs seen     Gram Stain Moderate (3+) Gram positive bacilli     Gram Stain Moderate (3+) Gram negative bacilli    Lactic Acid, Plasma    Collection Time: 06/23/24  9:51 AM    Specimen: Hand, Left; Blood   Result Value Ref Range    Lactate 1.0 0.5 - 2.0 mmol/L       If labs were ordered, I independently reviewed the results and considered them in treating the patient.        RADIOLOGY  No Radiology Exams Resulted Within Past 24 Hours    I ordered and independently reviewed the above noted radiographic studies.        See radiologist's dictation for official interpretation.        PROCEDURES    Procedures    ECG 12 Lead Electrolyte Imbalance   Final Result   Test Reason : Electrolyte Imbalance   Blood Pressure :   */*   mmHG   Vent. Rate :  74 BPM     Atrial Rate :  74 BPM      P-R Int : 152 ms          QRS Dur :  68 ms       QT Int : 362 ms       P-R-T Axes :  41 -12  15 degrees      QTc Int : 401 ms      Sinus rhythm with occasional premature ventricular complexes   Abnormal ECG   When compared with ECG of 08-MAY-2011 00:56,      Confirmed by FRITZ GARCES ([id number]) on 6/23/2024 12:00:37 PM      Referred By: EDMD           Confirmed By: FRITZ GARCES          MEDICATIONS GIVEN IN ER    Medications   sodium chloride 0.9 % bolus 1,000 mL (1,000 mL Intravenous Not Given 6/22/24 2011)   levothyroxine (SYNTHROID, LEVOTHROID) tablet 137 mcg (137 mcg Oral Given 6/23/24 0925)   valsartan (DIOVAN) tablet 80 mg (80 mg Oral Given 6/23/24 0959)   hydroCHLOROthiazide  tablet 12.5 mg (12.5 mg Oral Given 6/23/24 6755)   cephalexin (KEFLEX) capsule 500 mg (500 mg Oral Given 6/22/24 6392)   cefTRIAXone (ROCEPHIN) 2,000 mg in sodium chloride 0.9 % 100 mL MBP (0 mg Intravenous Stopped 6/23/24 1105)         MEDICAL DECISION MAKING, PROGRESS, and CONSULTS    All labs, if obtained, have been independently reviewed by me.  All radiology studies, if obtained, have been reviewed by me and the radiologist dictating the report.  All EKG's, if obtained, have been independently viewed and interpreted by me/my attending physician.      Discussion below represents my analysis of pertinent findings related to patient's condition, differential diagnosis, treatment plan and final disposition.                         Differential diagnosis:    Acute ester, stroke, intracranial hemorrhage, hypercalcemia, hypothyroidism, medication adverse effect, sepsis, anemia, electrolyte abnormality      Additional sources:    - Discussed/ obtained information from independent historians: Family at bedside    - External (non-ED) record review:  Chart review of outpatient notes with Rafy in clinic shows history of:  Hypercalcemia    Hypothyroidism    Adult health examination    Hepatitis C screening    Mild recurrent major depression    Chronic kidney disease stage 2    Benign essential hypertension    - Chronic or social conditions impacting care: History of depression and ester, hypothyroidism, chronic kidney disease, hypertension    - Shared decision making: Patient/patient representative in complete agreement with current plans for evaluation and management.      Orders placed during this visit:  Orders Placed This Encounter   Procedures    COVID PRE-OP / PRE-PROCEDURE SCREENING ORDER (NO ISOLATION) - Swab, Nasopharynx    COVID-19, FLU A/B, RSV PCR 1 HR TAT - Swab, Nasopharynx    Wound Culture - Wound, Leg, Left    Blood Culture - Blood,    Blood Culture - Blood,    Comprehensive Metabolic Panel    Urinalysis With  Microscopic If Indicated (No Culture) - Urine, Clean Catch    Single High Sensitivity Troponin T    Lactic Acid, Plasma    C-reactive Protein    Procalcitonin    T4, Free    TSH    Magnesium    Salicylate Level    Urine Drug Screen - Urine, Clean Catch    Ethanol    Acetaminophen Level    CBC Auto Differential    Calcium, Ionized    Fentanyl, Urine - Urine, Clean Catch    Single High Sensitivity Troponin T    Lactic Acid, Plasma    Diet: Regular/House; Fluid Consistency: Thin (IDDSI 0)    Behavioral health eval  Misc Nursing Order (Specify)    Please order food tray  Nursing Communication    ECG 12 Lead Electrolyte Imbalance    CBC & Differential         Additional orders considered but not ordered:  CT scan of the head refused by family    ED Course:    Consultants:      ED Course as of 06/23/24 1204   Sat Jun 22, 2024   1444 Chart review of outpatient notes with Rafy in clinic shows history of:  Hypercalcemia    Hypothyroidism    Adult health examination    Hepatitis C screening    Mild recurrent major depression    Chronic kidney disease stage 2    Benign essential hypertension   [CC]   1525 This is an 84-year-old woman with history of prior episodes of ester recent medication adjustments to her psychiatric medications brought in by family for manic type symptoms she says she has not slept in at least 6 days.  She called everyone in the family this morning at 5 AM they report she has been going on shopping sprees initially interested in [employer] and now a couch.  Of note her Cymbalta medication had been discontinued with her not knowing and then was restarted in May.  She has been having manic type symptoms for 2 weeks.  She was restarted on her Abilify 2 days ago but they feel like if anything the ester is worsening.  She complains that she feels like she is going to die and also was taken to the hospital the other day for concern for brain tumor because she had a headache.  Family would like her to be placed in  inpatient psychiatric care.  No other complaints at this time. [CC]   1526 She arrived awake and alert mildly hypertensive otherwise vitals within normal limits she is alert and oriented for me he has good insight but they are certainly describing multiple manic behaviors.  She has history of hypothyroidism on thyroid replacement as well as hypercalcemia to rule out medical cause of her psychiatric symptoms obtaining basic laboratory workup toxicologic screening, thyroid function, head CT.  Consulting behavioral health.  Will reevaluate pending initial workup. [CC]   2204 Labs generally reassuring CBC reassuring and nonactionable.  Very mild hypercalcemia at 10.8 at that time and if to be causing her psychiatric issues but treating with IV fluids.  Normal lactate.  Toxicologic screening is negative  Elevated TSH at 8.4 consistent with her hypothyroidism but normal free T4.  Urinalysis is clean troponin very mildly elevated at 17 however repeat troponin shows no delta she denies chest pain.  Family notified me that she has had some leg redness she does appear to have a very mild cellulitis between the ankle and knee of the left leg treating her with oral Keflex and giving a prescription.  Behavioral health has evaluated and agrees that she is mildly manic likely related to her medications and would probably benefit from inpatient psychiatric care patient initially refused this son is POA but after family discussed patient is now agreeable to voluntary transfer to psychiatric facility.  Behavioral health has been working for several hours trying to obtain placement unsuccessfully.  Contacted by [facility] who is attempting a new facility will need a COVID swab which I have added. [CC]   Seema Jun 23, 2024   0809 Waiting on response from  [facility] regarding transfer. COVID negative/flu negative/RSV negative. Dr. Storey will be taking over her final disposition. [LD]   1004 Patient declined admission at  [facility] facility   [IR]   1004 Spoke with Dr. Dodd hospitalist, only telepsych available [IR]   1005 Spoke with , do not accept geriatric psych for admission only medicine admission [IR]   1057 Spoke with assisted living [facility] while patient resited, they will accept patient back.  Patient will be discharged home with outpatient psychiatry follow-up.  Patient and family agreeable [IR]      ED Course User Index  [CC] King Menezes MD  [IR] Neva Hawkins APRN  [LD] Jeanna Wright MD              Shared Decision Making:  After my consideration of clinical presentation and any laboratory/radiology studies obtained, I discussed the findings with the patient/patient representative who is in agreement with the treatment plan and the final disposition.   Risks and benefits of discharge and/or observation/admission were discussed.       AS OF 12:04 EDT VITALS:    BP - 160/97  HR - 86  TEMP - 98.3 °F (36.8 °C) (Oral)  O2 SATS - 97%                  DIAGNOSIS  Final diagnoses:   Chantelle   Cellulitis of left leg   Medication course changed   Hypothyroidism, unspecified type   Insomnia, unspecified type         DISPOSITION  DISCHARGE    Patient discharged in stable condition.    Reviewed implications of results, diagnosis, meds, responsibility to follow up, warning signs and symptoms of possible worsening, potential complications and reasons to return to ER.    Patient/Family voiced understanding of above instructions.    Discussed plan for discharge, as there is no emergent indication for admission.  Pt/family is agreeable and understands need for follow up and possible repeat testing.  Pt/family is aware that discharge does not mean that nothing is wrong but that it indicates no emergency is currently present that requires admission and they must continue care with follow-up as given below or with a physician of their choice.     FOLLOW-UP  Man Carrion, DO  [address]  [address]  [place] [address]  [phone]          [facility]  [address]  [facility] [address] [phone]        [facility] EMERGENCY DEPARTMENT  [address]  [facility] [address] [phone]    If symptoms worsen         Medication List        New Prescriptions      cephalexin 500 MG capsule  Commonly known as: KEFLEX  Take 1 capsule by mouth 3 (Three) Times a Day for 7 days.               Where to Get Your Medications        These medications were sent to [id number] - [place], [place] - [address] - [phone]  - [phone]   [address], [place] [id number]      Phone: [phone]   cephalexin 500 MG capsule             Please note that portions of this document were completed with voice recognition software.        King Menezes MD  06/23/24 1544

## 2024-06-23 NOTE — ED PROVIDER NOTES
"I reevaluated patient at bedside, she is pleasant 84-year-old female with past medical history of mood disorder, hypertension, lung cancer, hypothyroid.  She was resting, sleeping with her daughter being at bedside.  I woke patient up, she had no complaints at this time.  She received her antibiotic last night but no blood pressure medications that includes valsartan and HCTZ, she also did not receive Cymbalta.  She takes levothyroxine in the mornings.  While I was in the room, she spoke with psychiatric  with [facility], she is agreeable to go there.  I received a message that [facility] denied her due to \"being frail\" and not being a good candidate for their facility  I ordered valsartan, HCTZ, Cymbalta, levothyroxine and food tray for this patient.  Cymbalta was cancelled by Dr. Wright    Spoke with [facility]'s Dr. Maharaj, do not accept geriatric psych patient for transfer.    Patient will be discharged to assisted living facility with outpatient follow-up and antibiotic treatment for mild cellulitis         Neva Hawkins, APRN  06/23/24 1106    "

## 2024-06-26 LAB
BACTERIA SPEC AEROBE CULT: NORMAL
GRAM STN SPEC: NORMAL

## 2024-06-28 LAB
BACTERIA SPEC AEROBE CULT: NORMAL
BACTERIA SPEC AEROBE CULT: NORMAL

## 2024-10-20 ENCOUNTER — APPOINTMENT (OUTPATIENT)
Facility: HOSPITAL | Age: 84
End: 2024-10-20
Payer: MEDICARE

## 2024-10-20 ENCOUNTER — HOSPITAL ENCOUNTER (EMERGENCY)
Facility: HOSPITAL | Age: 84
Discharge: NURSING, REHAB OR LONG-TERM CARE | End: 2024-10-20
Attending: EMERGENCY MEDICINE | Admitting: EMERGENCY MEDICINE
Payer: MEDICARE

## 2024-10-20 VITALS
RESPIRATION RATE: 16 BRPM | HEART RATE: 66 BPM | DIASTOLIC BLOOD PRESSURE: 75 MMHG | HEIGHT: 60 IN | OXYGEN SATURATION: 97 % | TEMPERATURE: 98.3 F | BODY MASS INDEX: 33.38 KG/M2 | SYSTOLIC BLOOD PRESSURE: 165 MMHG | WEIGHT: 170 LBS

## 2024-10-20 DIAGNOSIS — R51.9 ACUTE NONINTRACTABLE HEADACHE, UNSPECIFIED HEADACHE TYPE: ICD-10-CM

## 2024-10-20 DIAGNOSIS — J01.90 ACUTE NON-RECURRENT SINUSITIS, UNSPECIFIED LOCATION: ICD-10-CM

## 2024-10-20 DIAGNOSIS — R42 VERTIGO: Primary | ICD-10-CM

## 2024-10-20 LAB
ALBUMIN SERPL-MCNC: 4 G/DL (ref 3.5–5.2)
ALBUMIN/GLOB SERPL: 1.4 G/DL
ALP SERPL-CCNC: 90 U/L (ref 39–117)
ALT SERPL W P-5'-P-CCNC: 12 U/L (ref 1–33)
ANION GAP SERPL CALCULATED.3IONS-SCNC: 6.8 MMOL/L (ref 5–15)
AST SERPL-CCNC: 27 U/L (ref 1–32)
BASOPHILS # BLD AUTO: 0.03 10*3/MM3 (ref 0–0.2)
BASOPHILS NFR BLD AUTO: 0.4 % (ref 0–1.5)
BILIRUB SERPL-MCNC: 0.4 MG/DL (ref 0–1.2)
BUN SERPL-MCNC: 15 MG/DL (ref 8–23)
BUN/CREAT SERPL: 20.3 (ref 7–25)
CALCIUM SPEC-SCNC: 9.6 MG/DL (ref 8.6–10.5)
CHLORIDE SERPL-SCNC: 93 MMOL/L (ref 98–107)
CO2 SERPL-SCNC: 32.2 MMOL/L (ref 22–29)
CREAT SERPL-MCNC: 0.74 MG/DL (ref 0.57–1)
DEPRECATED RDW RBC AUTO: 46.4 FL (ref 37–54)
EGFRCR SERPLBLD CKD-EPI 2021: 78.9 ML/MIN/1.73
EOSINOPHIL # BLD AUTO: 0.29 10*3/MM3 (ref 0–0.4)
EOSINOPHIL NFR BLD AUTO: 3.8 % (ref 0.3–6.2)
ERYTHROCYTE [DISTWIDTH] IN BLOOD BY AUTOMATED COUNT: 13 % (ref 12.3–15.4)
GLOBULIN UR ELPH-MCNC: 2.9 GM/DL
GLUCOSE SERPL-MCNC: 79 MG/DL (ref 65–99)
HCT VFR BLD AUTO: 40.5 % (ref 34–46.6)
HGB BLD-MCNC: 13.2 G/DL (ref 12–15.9)
IMM GRANULOCYTES # BLD AUTO: 0.05 10*3/MM3 (ref 0–0.05)
IMM GRANULOCYTES NFR BLD AUTO: 0.6 % (ref 0–0.5)
LYMPHOCYTES # BLD AUTO: 1.15 10*3/MM3 (ref 0.7–3.1)
LYMPHOCYTES NFR BLD AUTO: 14.9 % (ref 19.6–45.3)
MCH RBC QN AUTO: 31.4 PG (ref 26.6–33)
MCHC RBC AUTO-ENTMCNC: 32.6 G/DL (ref 31.5–35.7)
MCV RBC AUTO: 96.4 FL (ref 79–97)
MONOCYTES # BLD AUTO: 0.88 10*3/MM3 (ref 0.1–0.9)
MONOCYTES NFR BLD AUTO: 11.4 % (ref 5–12)
NEUTROPHILS NFR BLD AUTO: 5.33 10*3/MM3 (ref 1.7–7)
NEUTROPHILS NFR BLD AUTO: 68.9 % (ref 42.7–76)
PLATELET # BLD AUTO: 204 10*3/MM3 (ref 140–450)
PMV BLD AUTO: 9.6 FL (ref 6–12)
POTASSIUM SERPL-SCNC: 4.6 MMOL/L (ref 3.5–5.2)
PROT SERPL-MCNC: 6.9 G/DL (ref 6–8.5)
RBC # BLD AUTO: 4.2 10*6/MM3 (ref 3.77–5.28)
SODIUM SERPL-SCNC: 132 MMOL/L (ref 136–145)
WBC NRBC COR # BLD AUTO: 7.73 10*3/MM3 (ref 3.4–10.8)

## 2024-10-20 PROCEDURE — 25010000002 DIPHENHYDRAMINE PER 50 MG: Performed by: EMERGENCY MEDICINE

## 2024-10-20 PROCEDURE — 80053 COMPREHEN METABOLIC PANEL: CPT | Performed by: EMERGENCY MEDICINE

## 2024-10-20 PROCEDURE — 96374 THER/PROPH/DIAG INJ IV PUSH: CPT

## 2024-10-20 PROCEDURE — 70450 CT HEAD/BRAIN W/O DYE: CPT

## 2024-10-20 PROCEDURE — 25010000002 KETOROLAC TROMETHAMINE PER 15 MG: Performed by: EMERGENCY MEDICINE

## 2024-10-20 PROCEDURE — 99284 EMERGENCY DEPT VISIT MOD MDM: CPT

## 2024-10-20 PROCEDURE — 25810000003 SODIUM CHLORIDE 0.9 % SOLUTION: Performed by: EMERGENCY MEDICINE

## 2024-10-20 PROCEDURE — 96375 TX/PRO/DX INJ NEW DRUG ADDON: CPT

## 2024-10-20 PROCEDURE — 85025 COMPLETE CBC W/AUTO DIFF WBC: CPT | Performed by: EMERGENCY MEDICINE

## 2024-10-20 RX ORDER — SODIUM CHLORIDE 0.9 % (FLUSH) 0.9 %
10 SYRINGE (ML) INJECTION AS NEEDED
Status: DISCONTINUED | OUTPATIENT
Start: 2024-10-20 | End: 2024-10-20 | Stop reason: HOSPADM

## 2024-10-20 RX ORDER — MECLIZINE HYDROCHLORIDE 25 MG/1
25 TABLET ORAL 4 TIMES DAILY PRN
Qty: 14 TABLET | Refills: 0 | Status: SHIPPED | OUTPATIENT
Start: 2024-10-20 | End: 2024-10-20

## 2024-10-20 RX ORDER — DIPHENHYDRAMINE HYDROCHLORIDE 50 MG/ML
12.5 INJECTION INTRAMUSCULAR; INTRAVENOUS ONCE
Status: COMPLETED | OUTPATIENT
Start: 2024-10-20 | End: 2024-10-20

## 2024-10-20 RX ORDER — MECLIZINE HYDROCHLORIDE 25 MG/1
25 TABLET ORAL ONCE
Status: COMPLETED | OUTPATIENT
Start: 2024-10-20 | End: 2024-10-20

## 2024-10-20 RX ORDER — KETOROLAC TROMETHAMINE 15 MG/ML
10 INJECTION, SOLUTION INTRAMUSCULAR; INTRAVENOUS ONCE
Status: COMPLETED | OUTPATIENT
Start: 2024-10-20 | End: 2024-10-20

## 2024-10-20 RX ORDER — MECLIZINE HYDROCHLORIDE 25 MG/1
25 TABLET ORAL 4 TIMES DAILY PRN
Qty: 14 TABLET | Refills: 0 | Status: SHIPPED | OUTPATIENT
Start: 2024-10-20 | End: 2024-10-27

## 2024-10-20 RX ADMIN — DIPHENHYDRAMINE HYDROCHLORIDE 12.5 MG: 50 INJECTION INTRAMUSCULAR; INTRAVENOUS at 12:45

## 2024-10-20 RX ADMIN — MECLIZINE HYDROCHLORIDE 25 MG: 25 TABLET ORAL at 13:41

## 2024-10-20 RX ADMIN — SODIUM CHLORIDE 250 ML: 9 INJECTION, SOLUTION INTRAVENOUS at 13:41

## 2024-10-20 RX ADMIN — KETOROLAC TROMETHAMINE 10 MG: 15 INJECTION, SOLUTION INTRAMUSCULAR; INTRAVENOUS at 12:44

## 2024-10-20 NOTE — FSED PROVIDER NOTE
"Subjective  History of Present Illness:    The patient reprots a bifrontal headache that radiates to the back.  Pt reports that it is aggravated by becoming upset.  Pt denies vision changes, weakness, or numbness. Pt reports a cough productive of dark yellow sputum and left ear is \"stopped up\"      Nurses Notes reviewed and agree, including vitals, allergies, social history and prior medical history.     REVIEW OF SYSTEMS:   Review of Systems   HENT:  Positive for ear pain.    Respiratory:  Positive for cough.        Past Medical History:   Diagnosis Date    COPD (chronic obstructive pulmonary disease)     Depression     Fibrocystic breast     Hypertension     Injury of back     Lung cancer     Menopausal symptoms     Migraine        Allergies:    Erythromycin      History reviewed. No pertinent surgical history.      Social History     Socioeconomic History    Marital status:    Tobacco Use    Smoking status: Former     Current packs/day: 0.00     Types: Cigarettes     Quit date:      Years since quittin.    Smokeless tobacco: Former   Substance and Sexual Activity    Alcohol use: No    Drug use: Not Currently    Sexual activity: Not Currently     Partners: Female         Family History   Problem Relation Age of Onset    Breast cancer Mother     Colon cancer Mother     Colon cancer Sister        Objective  Physical Exam:  /75   Pulse 66   Temp 98.3 °F (36.8 °C) (Oral)   Resp 16   Ht 152.4 cm (60\")   Wt 77.1 kg (170 lb)   SpO2 97%   BMI 33.20 kg/m²      Physical Exam  Vitals reviewed.   Constitutional:       Appearance: Normal appearance.   HENT:      Right Ear: Ear canal and external ear normal.      Left Ear: Ear canal and external ear normal.   Eyes:      Conjunctiva/sclera: Conjunctivae normal.      Pupils: Pupils are equal, round, and reactive to light.   Cardiovascular:      Rate and Rhythm: Normal rate and regular rhythm.   Pulmonary:      Effort: Pulmonary effort is normal.      " Breath sounds: Normal breath sounds.   Musculoskeletal:         General: No swelling.   Skin:     General: Skin is warm and dry.      Capillary Refill: Capillary refill takes less than 2 seconds.   Neurological:      General: No focal deficit present.      Mental Status: She is alert and oriented to person, place, and time.      Cranial Nerves: No cranial nerve deficit.      Sensory: No sensory deficit.      Motor: No weakness.      Coordination: Coordination normal.   Psychiatric:         Mood and Affect: Mood normal.         Behavior: Behavior normal.         Procedures    ED Course:    ED Course as of 10/20/24 1605   Sun Oct 20, 2024   1312 Negative CT   [ADILIA]   1316 Pt reports headache is about the same but patient is now dizzy.  Pt has normal f/n/f.  [ADILIA]      ED Course User Index  [ADILIA] Joao Thomas MD       Lab Results (last 24 hours)       Procedure Component Value Units Date/Time    CBC & Differential [[id number]]  (Abnormal) Collected: 10/20/24 1240    Specimen: Blood Updated: 10/20/24 1249    Narrative:      The following orders were created for panel order CBC & Differential.  Procedure                               Abnormality         Status                     ---------                               -----------         ------                     CBC Auto Differential[[id number]]        Abnormal            Final result                 Please view results for these tests on the individual orders.    Comprehensive Metabolic Panel [[id number]]  (Abnormal) Collected: 10/20/24 1240    Specimen: Blood Updated: 10/20/24 1311     Glucose 79 mg/dL      BUN 15 mg/dL      Creatinine 0.74 mg/dL      Sodium 132 mmol/L      Potassium 4.6 mmol/L      Chloride 93 mmol/L      CO2 32.2 mmol/L      Calcium 9.6 mg/dL      Total Protein 6.9 g/dL      Albumin 4.0 g/dL      ALT (SGPT) 12 U/L      AST (SGOT) 27 U/L      Alkaline Phosphatase 90 U/L      Total Bilirubin 0.4 mg/dL      Globulin 2.9 gm/dL      A/G Ratio 1.4 g/dL       BUN/Creatinine Ratio 20.3     Anion Gap 6.8 mmol/L      eGFR 78.9 mL/min/1.73     Narrative:      GFR Normal >60  Chronic Kidney Disease <60  Kidney Failure <15    The GFR formula is only valid for adults with stable renal function between ages 18 and 70.    CBC Auto Differential [[id number]]  (Abnormal) Collected: 10/20/24 1240    Specimen: Blood Updated: 10/20/24 1249     WBC 7.73 10*3/mm3      RBC 4.20 10*6/mm3      Hemoglobin 13.2 g/dL      Hematocrit 40.5 %      MCV 96.4 fL      MCH 31.4 pg      MCHC 32.6 g/dL      RDW 13.0 %      RDW-SD 46.4 fl      MPV 9.6 fL      Platelets 204 10*3/mm3      Neutrophil % 68.9 %      Lymphocyte % 14.9 %      Monocyte % 11.4 %      Eosinophil % 3.8 %      Basophil % 0.4 %      Immature Grans % 0.6 %      Neutrophils, Absolute 5.33 10*3/mm3      Lymphocytes, Absolute 1.15 10*3/mm3      Monocytes, Absolute 0.88 10*3/mm3      Eosinophils, Absolute 0.29 10*3/mm3      Basophils, Absolute 0.03 10*3/mm3      Immature Grans, Absolute 0.05 10*3/mm3              CT Head Without Contrast    Result Date: 10/20/2024  CT HEAD WO CONTRAST Date of Exam: 10/20/2024 12:42 PM EDT Indication: headache. Comparison: None available. Technique: Axial CT images were obtained of the head without contrast administration.  Automated exposure control and iterative construction methods were used. Findings: No intracranial hemorrhage. Gray-white matter differentiation is maintained without evidence of an acute infarction. Multiple foci of decreased attenuation are present within the subcortical, deep cerebral, and periventricular white matter consistent with chronic small vessel/microangiopathic ischemic changes. No extra-axial mass or collection. The ventricles and sulci are prominent commensurate with involutional changes. The posterior fossa appears grossly normal. Sellar and suprasellar structures are normal. Lens replacements. The paranasal sinuses, ethmoid air cells, and mastoid air cells are aerated.  The bony calvarium is intact.     Impression: Impression: No acute intracranial pathology. Electronically Signed: Jayant Bobo MD  10/20/2024 1:05 PM EDT  Workstation ID: [id number]        [facility]      Initial impression of presenting illness: Sinusitis    DDX: includes but is not limited to: Otitis media, middle ear effusion, vertigo    Patient arrives by private vehicle with vitals interpreted by myself.     Pertinent features from physical exam: Normal neurologic exam.    Initial diagnostic plan: Labs and CT    Results from initial plan were reviewed and interpreted by me revealing nonactionable    Diagnostic information from other sources: Reviewed the patient's outside records including prior ER visits    Interventions / Re-evaluation: Patient reports feeling better    Medications   sodium chloride 0.9 % flush 10 mL (has no administration in time range)   ketorolac (TORADOL) injection 10 mg (10 mg Intravenous Given 10/20/24 1244)   diphenhydrAMINE (BENADRYL) injection 12.5 mg (12.5 mg Intravenous Given 10/20/24 1245)   sodium chloride 0.9 % bolus 250 mL (0 mL Intravenous Stopped 10/20/24 1400)   meclizine (ANTIVERT) tablet 25 mg (25 mg Oral Given 10/20/24 1341)       Results/clinical rationale were discussed with patient and patient's son      Data interpreted: Nursing notes reviewed, vital signs reviewed.  CBC with differential and CMP Images independantly reviewed and CT of the head reviewed independently interpreted by me.  EKG independently interpreted by me.  O2 saturation:    Care significantly affected by Social Determinants of Health (housing and economic circumstances, unemployment)               [] Yes     [x] No              If yes, Patient's care significantly limited by  Social Determinants of Health including:              [] Inadequate housing              [] Low income              [] Alcoholism and drug addiction in family              [] Problems related to primary support group              []  Unemployment              [] Problems related to employment              [] Other Social Determinants of Health:     Counseling: Discussed the results above with the patient regarding need for discharged home. Return precautions were given to patient and patient's son.  Patient understands and agrees plan of care.      -----  ED Disposition       ED Disposition   Discharge    Condition   Stable    Comment   --             Final diagnoses:   Vertigo   Acute nonintractable headache, unspecified headache type   Acute non-recurrent sinusitis, unspecified location      Your Follow-Up Providers       Man Carrion,  In 3 days.    Specialty: Family Medicine  [address]  1st Floor  [facility] [id number] [phone]               Go to  [facility] EMERGENCY DEPARTMENT [place].    Specialty: Emergency Medicine  Follow up details: If symptoms worsen  [address]  [facility] [address] [phone]                     Contact information for after-discharge care    Follow-up information has not been specified.                    Your medication list        START taking these medications        Instructions Last Dose Given Next Dose Due   amoxicillin-clavulanate 875-125 MG per tablet  Commonly known as: AUGMENTIN      Take 1 tablet by mouth 2 (Two) Times a Day for 7 days.       meclizine 25 MG tablet  Commonly known as: ANTIVERT      Take 1 tablet by mouth 4 (Four) Times a Day As Needed for Dizziness for up to 7 days.              CONTINUE taking these medications        Instructions Last Dose Given Next Dose Due   aspirin 81 MG chewable tablet      Chew 1 tablet Daily.       b complex-C-E-zinc tablet      Take 1 tablet by mouth Daily.       docusate sodium 100 MG capsule  Commonly known as: COLACE      Take 1 capsule by mouth 2 (Two) Times a Day.       DULoxetine 20 MG capsule  Commonly known as: CYMBALTA      Take 2 capsules by mouth Daily.       ferrous sulfate  325 (65 FE) MG tablet      Take 1 tablet by mouth Daily With Breakfast.       hydroCHLOROthiazide 12.5 MG tablet      Take 1 tablet by mouth Daily.       ibandronate 150 MG tablet  Commonly known as: BONIVA           levothyroxine 137 MCG tablet  Commonly known as: SYNTHROID, LEVOTHROID      Take 112 mcg by mouth Daily.       losartan 50 MG tablet  Commonly known as: COZAAR           mirtazapine 15 MG tablet  Commonly known as: REMERON      Take 1 tablet by mouth Every Night.       omeprazole 40 MG capsule  Commonly known as: priLOSEC           traZODone 50 MG tablet  Commonly known as: DESYREL           valsartan 160 MG tablet  Commonly known as: DIOVAN      Take 2 tablets by mouth Daily.       verapamil  MG 24 hr capsule  Commonly known as: VERELAN           vitamin C 250 MG tablet  Commonly known as: ASCORBIC ACID      Take 2 tablets by mouth Daily.                 Where to Get Your Medications        These medications were sent to [id number] - [place], [place] - [address] - [phone]  - [phone] FX  [address] [facility] [address]      Phone: [phone]   amoxicillin-clavulanate 875-125 MG per tablet  meclizine 25 MG tablet

## 2024-10-29 ENCOUNTER — HOSPITAL ENCOUNTER (EMERGENCY)
Facility: HOSPITAL | Age: 84
Discharge: NURSING, REHAB OR LONG-TERM CARE | End: 2024-10-29
Attending: EMERGENCY MEDICINE | Admitting: EMERGENCY MEDICINE
Payer: MEDICARE

## 2024-10-29 ENCOUNTER — APPOINTMENT (OUTPATIENT)
Facility: HOSPITAL | Age: 84
End: 2024-10-29
Payer: MEDICARE

## 2024-10-29 VITALS
BODY MASS INDEX: 33.38 KG/M2 | RESPIRATION RATE: 18 BRPM | HEIGHT: 60 IN | WEIGHT: 170 LBS | SYSTOLIC BLOOD PRESSURE: 156 MMHG | DIASTOLIC BLOOD PRESSURE: 96 MMHG | OXYGEN SATURATION: 90 % | HEART RATE: 76 BPM | TEMPERATURE: 98.2 F

## 2024-10-29 DIAGNOSIS — S90.31XA CONTUSION OF RIGHT FOOT, INITIAL ENCOUNTER: Primary | ICD-10-CM

## 2024-10-29 PROCEDURE — 73630 X-RAY EXAM OF FOOT: CPT

## 2024-10-29 PROCEDURE — 99283 EMERGENCY DEPT VISIT LOW MDM: CPT

## 2024-10-29 NOTE — FSED PROVIDER NOTE
Subjective   History of Present Illness  Complains of pain in the top of her right foot.  States that she accidentally ran over her right foot with her electronic scooter today she reports this happened around 4 PM.  Lives at a assisted living and they wanted her foot checked out.  Patient denies pain, states that she does not walk, only transfers herself.  Patient denies numbness, tingling, sensation.  No medications were taken prior to arrival.  Patient is a history of arthritis, COPD, depression, hypertension, migraine    History provided by:  Patient   used: No        Review of Systems   Musculoskeletal:         Foot injury     All other systems reviewed and are negative.      Past Medical History:   Diagnosis Date    COPD (chronic obstructive pulmonary disease)     Depression     Fibrocystic breast     Hypertension     Injury of back     Lung cancer     Menopausal symptoms     Migraine        Allergies   Allergen Reactions    Erythromycin        No past surgical history on file.    Family History   Problem Relation Age of Onset    Breast cancer Mother     Colon cancer Mother     Colon cancer Sister        Social History     Socioeconomic History    Marital status:    Tobacco Use    Smoking status: Former     Current packs/day: 0.00     Types: Cigarettes     Quit date:      Years since quittin.8    Smokeless tobacco: Former   Substance and Sexual Activity    Alcohol use: No    Drug use: Not Currently    Sexual activity: Not Currently     Partners: Female           Objective   Physical Exam  Vitals and nursing note reviewed.   Constitutional:       Appearance: Normal appearance. She is normal weight.   HENT:      Head: Normocephalic.      Nose: Nose normal.   Eyes:      Pupils: Pupils are equal, round, and reactive to light.   Cardiovascular:      Rate and Rhythm: Normal rate and regular rhythm.   Pulmonary:      Effort: Pulmonary effort is normal.      Breath sounds: Normal  breath sounds.   Abdominal:      General: Abdomen is flat.      Tenderness: There is no abdominal tenderness.   Musculoskeletal:         General: Normal range of motion.      Comments: Contusion over the dorsal side of the foot. No pain with active and passive ROM   Skin:     General: Skin is warm and dry.   Neurological:      General: No focal deficit present.      Mental Status: She is alert and oriented to person, place, and time. Mental status is at baseline.   Psychiatric:         Mood and Affect: Mood normal.         Behavior: Behavior normal.         Thought Content: Thought content normal.         Judgment: Judgment normal.         Procedures           ED Course                                       XR Foot 3+ View Right    Result Date: 10/29/2024  Impression: 1.Diffuse soft tissue prominence of the lower leg and foot. 2.No definite radiographic findings of acute osseous foot abnormality. 3.Osseous demineralization and mild to moderate osteoarthritis. Electronically Signed: Kit Gil  10/29/2024 6:17 PM EDT  Workstation ID: [id number]      Medical Decision Making  84-year-old female with right foot pain.  Differential diagnosis includes fracture, strain, contusion.  Pertinent physical exam features include contusion across the dorsal side of the right foot.  Diagnostic workup includes x-ray of the right foot.  No fracture noted, only soft tissue swelling.  I informed patient of these findings.  Patient is able to transfer to go to the bathroom while emergency department.  Patient will be discharged back to the assisted living facility.  Patient and family at bedside verbalized understand today's formation need for appropriate follow-up    Amount and/or Complexity of Data Reviewed  Radiology: ordered. Decision-making details documented in ED Course.        Final diagnoses:   Contusion of right foot, initial encounter       ED Disposition  ED Disposition       ED Disposition   Discharge    Condition   Stable     Comment   --               Man Carrion, DO  [address]  [address]  [place] [id number]  [phone]    Schedule an appointment as soon as possible for a visit in 2 days      [facility] EMERGENCY DEPARTMENT [place]  [address]  [facility] [address] [phone]  Go to   As needed, If symptoms worsen         Medication List      No changes were made to your prescriptions during this visit.